# Patient Record
Sex: MALE | Race: BLACK OR AFRICAN AMERICAN
[De-identification: names, ages, dates, MRNs, and addresses within clinical notes are randomized per-mention and may not be internally consistent; named-entity substitution may affect disease eponyms.]

---
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
## 2019-12-31 ENCOUNTER — HOSPITAL ENCOUNTER (OUTPATIENT)
Dept: HOSPITAL 62 - RAD | Age: 19
End: 2019-12-31
Attending: FAMILY MEDICINE
Payer: COMMERCIAL
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
## 2019-12-31 DIAGNOSIS — M25.562: Primary | ICD-10-CM

## 2019-12-31 DIAGNOSIS — S83.242S: ICD-10-CM

## 2019-12-31 NOTE — RADIOLOGY REPORT (SQ)
EXAM DESCRIPTION:  MRI LT LOWER JOINT WITHOUT



COMPLETED DATE/TIME:  12/31/2019 9:32 am



REASON FOR STUDY:  LEFT KNEE PAIN



COMPARISON:  None.



TECHNIQUE:  Leftknee images acquired and stored on PACS.  Multiplanar images include fat sensitive se
quences as T1, water sensitive sequences as FST2 or STIR, cartilage sensitive sequences as FSPD, and 
gradient echo sequences.



LIMITATIONS:  Up to moderate limiting motion artifact on many sequences.



FINDINGS:  JOINT AND BURSAE: No effusion.

BONE CORTEX AND MARROW: No alteration of signal to suggest marrow replacement. No worrisome bone lesi
ons. No occult fracture.

ACL: Poorly seen.  Diminutive appearance.  Significant tear is not excluded.

PCL: Intact.

MCL: Intact. No periligamentous edema or fluid.

LCL: Intact. No periligamentous edema or fluid.

MEDIAL MENISCUS: Tear throughout the posterior root and posterior horn, relatively macerated appearan
ce here with extrusion.

LATERAL MENISCUS: Complex tear in the posterior horn and root with a centrally displaced fragment sug
gested.

MEDIAL COMPARTMENT: Denuded hyaline cartilage in the weight-bearing femoral condyle.  Subchondral delvis
ma regionally.

LATERAL COMPARTMENT: Cartilage preserved. No bone bruises or reactive marrow edema. No osteophytes.

PATELLA: No chondromalacia. No subchondral cysts. Medial and lateral retinacula intact.

EXTENSOR MECHANISM: Intact. Quadriceps and patella tendons normal.

SOFT TISSUES: Adjacent muscles and subcutaneous tissues normal.  Normal flow void in popliteal artery
 and vein.

OTHER: No other significant finding.



IMPRESSION:

1. Extensive medial meniscus tear.

2. Extensive lateral meniscus tear with centrally displaced fragment.

3. 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

4. ACL difficult to visualize, deficiency suspected.



TECHNICAL DOCUMENTATION:  JOB ID:  [id number]

 [employer]- All Rights Reserved



Reading location - IP/workstation name: [email or web address]